# Patient Record
Sex: FEMALE | Race: WHITE | NOT HISPANIC OR LATINO | URBAN - METROPOLITAN AREA
[De-identification: names, ages, dates, MRNs, and addresses within clinical notes are randomized per-mention and may not be internally consistent; named-entity substitution may affect disease eponyms.]

---

## 2024-07-02 ENCOUNTER — OFFICE VISIT (OUTPATIENT)
Dept: URBAN - METROPOLITAN AREA CLINIC 19 | Facility: CLINIC | Age: 24
End: 2024-07-02
Payer: COMMERCIAL

## 2024-07-02 ENCOUNTER — DASHBOARD ENCOUNTERS (OUTPATIENT)
Age: 24
End: 2024-07-02

## 2024-07-02 VITALS
TEMPERATURE: 98.7 F | SYSTOLIC BLOOD PRESSURE: 120 MMHG | HEART RATE: 89 BPM | DIASTOLIC BLOOD PRESSURE: 80 MMHG | OXYGEN SATURATION: 100 % | BODY MASS INDEX: 27.34 KG/M2 | HEIGHT: 67 IN | WEIGHT: 174.2 LBS

## 2024-07-02 DIAGNOSIS — R10.32 LLQ PAIN: ICD-10-CM

## 2024-07-02 DIAGNOSIS — R11.2 NAUSEA AND VOMITING: ICD-10-CM

## 2024-07-02 DIAGNOSIS — R10.12 LUQ PAIN: ICD-10-CM

## 2024-07-02 DIAGNOSIS — R19.7 DIARRHEA: ICD-10-CM

## 2024-07-02 PROCEDURE — 99204 OFFICE O/P NEW MOD 45 MIN: CPT | Performed by: NURSE PRACTITIONER

## 2024-07-02 RX ORDER — PANTOPRAZOLE SODIUM 40 MG/1
1 TABLET TABLET, DELAYED RELEASE ORAL ONCE A DAY
Qty: 30 | Refills: 1 | OUTPATIENT
Start: 2024-07-02

## 2024-07-02 NOTE — HPI-TODAY'S VISIT:
22 yo female presents for Abdominal pain, N/V, diarrhea.  She reports symptom onset in 2016 Abdominal pain, N/V, diarrhea. Thought at first chocoloate was the issue, cut out dairy but no difference. Started eating plain salad and still hurt her stomach. Pain starts in upper quads and then move down. Then have diarrhea. Then start getting hot, sweating and passed out multile times. Just this year started having vomiting. Just gave birth 10 mo ago. First trimester with vomiting and diarrhea. Then was able to get through the remainder - had preeclamsia so delievered at 38 weeks. iron 6% just put on iron Reports having dark stools prior to starting iron. No bright red blood in stool.  Recently had abdominal Xray at [facility] which she states was normal.  Denies NSAID use. Was given dicyclomine from urgent care - helps on occasion. Had labs last month and reports stable except low iron. Can feel shaky and dizzy at times Taking zofran as needed  Mother had diverticulitis s/p colectomy. No known family hx of IBD or GI or colon cancer.

## 2024-07-02 NOTE — PHYSICAL EXAM GASTROINTESTINAL
Abdomen , soft, LUQ, LLQ tenderness, nondistended , no guarding or rigidity , no masses palpable , normal bowel sounds , Liver and Spleen , no hepatosplenomegaly

## 2024-07-02 NOTE — PHYSICAL EXAM HENT:
Head, normocephalic, atraumatic, Face, Face within normal limits, Ears, External ears within normal limits, Nose/Nasopharynx, External nose normal appearance, nares patent, no nasal discharge, Mouth and Throat, Oral cavity appearance normal, Lips, Appearance normal STIFF

## 2024-07-08 ENCOUNTER — LAB OUTSIDE AN ENCOUNTER (OUTPATIENT)
Dept: URBAN - METROPOLITAN AREA CLINIC 19 | Facility: CLINIC | Age: 24
End: 2024-07-08

## 2024-07-11 LAB
ADENOVIRUS F 40/41: NOT DETECTED
CALPROTECTIN, STOOL - QDX: (no result)
CAMPYLOBACTER: NOT DETECTED
CLOSTRIDIUM DIFFICILE: NOT DETECTED
ENTAMOEBA HISTOLYTICA: NOT DETECTED
ENTEROAGGREGATIVE E.COLI: NOT DETECTED
ENTEROTOXIGENIC E.COLI: NOT DETECTED
ESCHERICHIA COLI O157: NOT DETECTED
GIARDIA LAMBLIA: NOT DETECTED
H. PYLORI (EIA) - QDX: NEGATIVE
NOROVIRUS GI/GII: NOT DETECTED
OVA AND PARASITE - QDX: (no result)
PANCREATICELASTASE ELISA, STOOL: (no result)
ROTAVIRUS A: NOT DETECTED
SALMONELLA SPP.: NOT DETECTED
SHIGA-LIKE TOXIN PRODUCING E.COLI: NOT DETECTED
SHIGELLA SPP. / ENTEROINVASIVE E.COLI: NOT DETECTED
VIBRIO PARAHAEMOLYTICUS: NOT DETECTED
VIBRIO SPP.: NOT DETECTED
YERSINIA ENTEROCOLITICA: NOT DETECTED

## 2024-07-15 ENCOUNTER — TELEPHONE ENCOUNTER (OUTPATIENT)
Dept: URBAN - METROPOLITAN AREA CLINIC 19 | Facility: CLINIC | Age: 24
End: 2024-07-15

## 2024-08-02 ENCOUNTER — OFFICE VISIT (OUTPATIENT)
Dept: URBAN - METROPOLITAN AREA CLINIC 19 | Facility: CLINIC | Age: 24
End: 2024-08-02
Payer: COMMERCIAL

## 2024-08-02 DIAGNOSIS — R10.84 ABDOMINAL CRAMPING, GENERALIZED: ICD-10-CM

## 2024-08-02 DIAGNOSIS — R52 PAIN AGGRAVATED BY EATING OR DRINKING: ICD-10-CM

## 2024-08-02 PROCEDURE — 99213 OFFICE O/P EST LOW 20 MIN: CPT | Performed by: NURSE PRACTITIONER

## 2024-08-02 RX ORDER — PANTOPRAZOLE SODIUM 40 MG/1
1 TABLET TABLET, DELAYED RELEASE ORAL ONCE A DAY
Qty: 30 | Refills: 1 | Status: ACTIVE | COMMUNITY
Start: 2024-07-02

## 2024-08-02 NOTE — HPI-TODAY'S VISIT:
7/2/2024 (DIMITRI Mercado):   22 yo female presents for Abdominal pain, N/V, diarrhea. She reports symptom onset in 2016 Abdominal pain, N/V, diarrhea. Thought at first chocoloate was the issue, cut out dairy but no difference. Started eating plain salad and still hurt her stomach. Pain starts in upper quads and then move down. Then have diarrhea. Then start getting hot, sweating and passed out multile times. Just this year started having vomiting. Just gave birth 10 mo ago. First trimester with vomiting and diarrhea. Then was able to get through the remainder - had preeclamsia so delievered at 38 weeks. iron 6% just put on iron. Reports having dark stools prior to starting iron. No bright red blood in stool. Recently had abdominal Xray at [facility] which she states was normal. Denies NSAID use. Was given dicyclomine from urgent care - helps on occasion. Had labs last month and reports stable except low iron. Can feel shaky and dizzy at times. Taking zofran as needed Mother had diverticulitis s/p colectomy. No known family hx of IBD or GI or colon cancer.    8/2/2024 (DIMITRI Mercado): Here for follow-up. GPP stool studies including H. pylori and O&P was negative for infection.  Normal fecal calprotectin and normal pancreatic elastase.  CT A/P with contrast was done on 7/3/2024 was negative for mass or obstruction but showed distended pelvic and gonadal veins suggesting pelvic congestion. She states she sent copy of her CT to her GYN and he referred her to a doctor but she does not know who or what type of doctor (she thinks it may have been a radiologist) She was started on pantoprazole 40 mg/day and recommended follow-up in a month She stopped Pantoprazole after 2 weeks - felt it caused her having to Still takes dicyclomine PRN, felt it helped in the beginning but then pain and diarrhea ongoing. She reports she is moving to [place] next Monday. She reports ongoing vaginal bleeding since birth, was started on OC which stops the bleed but if she misses a dose, bleeding returns.

## 2024-08-05 ENCOUNTER — OFFICE VISIT (OUTPATIENT)
Dept: URBAN - METROPOLITAN AREA CLINIC 19 | Facility: CLINIC | Age: 24
End: 2024-08-05

## 2024-08-05 LAB
A/G RATIO: 1.3
ALBUMIN: 4.2
ALKALINE PHOSPHATASE: 47
ALT (SGPT): 14
AST (SGOT): 17
BILIRUBIN, TOTAL: 0.3
BUN/CREATININE RATIO: (no result)
BUN: 10
C-REACTIVE PROTEIN, QUANT: 9.7
CALCIUM: 9
CARBON DIOXIDE, TOTAL: 24
CHLORIDE: 105
CREATININE: 0.96
EGFR: 85
GLOBULIN, TOTAL: 3.3
GLUCOSE: 74
HEMATOCRIT: 37.9
HEMOGLOBIN: 12.1
IMMUNOGLOBULIN A, QN, SERUM: 174
INTERPRETATION: (no result)
LIPASE: 24
MCH: 27.6
MCHC: 31.9
MCV: 86.5
MPV: 10.6
PLATELET COUNT: 244
POTASSIUM: 3.9
PROTEIN, TOTAL: 7.5
RDW: 14.7
RED BLOOD CELL COUNT: 4.38
SODIUM: 139
T-TRANSGLUTAMINASE (TTG) IGA: <1
WHITE BLOOD CELL COUNT: 4.5